# Patient Record
Sex: FEMALE | Employment: FULL TIME | URBAN - METROPOLITAN AREA
[De-identification: names, ages, dates, MRNs, and addresses within clinical notes are randomized per-mention and may not be internally consistent; named-entity substitution may affect disease eponyms.]

---

## 2022-05-31 ENCOUNTER — OFFICE VISIT (OUTPATIENT)
Dept: OBGYN CLINIC | Age: 67
End: 2022-05-31
Payer: COMMERCIAL

## 2022-05-31 VITALS
HEIGHT: 62 IN | DIASTOLIC BLOOD PRESSURE: 71 MMHG | BODY MASS INDEX: 38.9 KG/M2 | SYSTOLIC BLOOD PRESSURE: 111 MMHG | WEIGHT: 211.4 LBS

## 2022-05-31 DIAGNOSIS — Z01.419 ROUTINE GYNECOLOGICAL EXAMINATION: Primary | ICD-10-CM

## 2022-05-31 DIAGNOSIS — R68.82 DECREASED LIBIDO: ICD-10-CM

## 2022-05-31 DIAGNOSIS — Z78.0 OSTEOPENIA AFTER MENOPAUSE: ICD-10-CM

## 2022-05-31 DIAGNOSIS — M85.80 OSTEOPENIA AFTER MENOPAUSE: ICD-10-CM

## 2022-05-31 PROCEDURE — 99387 INIT PM E/M NEW PAT 65+ YRS: CPT | Performed by: OBSTETRICS & GYNECOLOGY

## 2022-05-31 RX ORDER — BUPROPION HYDROCHLORIDE 100 MG/1
TABLET, EXTENDED RELEASE ORAL
COMMUNITY

## 2022-05-31 RX ORDER — ESTRADIOL 10 UG/1
INSERT VAGINAL
COMMUNITY
End: 2022-05-31 | Stop reason: SDUPTHER

## 2022-05-31 RX ORDER — ALPRAZOLAM 1 MG/1
TABLET ORAL
COMMUNITY
Start: 2022-03-13

## 2022-05-31 RX ORDER — ESTRADIOL 10 UG/1
1 INSERT VAGINAL
Qty: 28 EACH | Refills: 3 | Status: SHIPPED | OUTPATIENT
Start: 2022-06-02 | End: 2022-08-31

## 2022-05-31 RX ORDER — ALBUTEROL SULFATE 90 UG/1
AEROSOL, METERED RESPIRATORY (INHALATION)
COMMUNITY

## 2022-05-31 RX ORDER — PANTOPRAZOLE SODIUM 40 MG/1
TABLET, DELAYED RELEASE ORAL
COMMUNITY
Start: 2022-03-10

## 2022-05-31 NOTE — PROGRESS NOTES
ANNUAL EXAM 65+    Ji Santos is a ,  73 y.o. female   No LMP recorded. Patient is postmenopausal.    She presents for her annual checkup. She is having no significant problems. Hormonal status:  She is not having vasomotor symptoms. The patient is using any ERT Imvexxy. Sexual history:  She  reports being sexually active and has had partner(s) who are male. She reports using the following method of birth control/protection: Surgical.   Is having issues with Low sex drive. Medical conditions:  Since her last annual GYN exam about one year ago, she has the following changes in her health history: h pylori / ulcer / GERD. Pap and Mammogram History:  Her most recent Pap smear was normal obtained 2021. The patient had a recent mammogram 2021 which was negative for malignancy. Gyn Flowsheet    GYN HISTORY 2022   Mammogram Date 2021   Mammogram Results WNL   Pap Date 2021   Pap Results WNL Neg HPV       Breast Cancer History: sister & mom    Osteoporosis History:  Family history does not include a first or second degree relative with osteopenia or osteoporosis. A bone density scan was obtained 2018 and revealed Osteopenia. She is currently taking calcium and vit D. Medical History: There is no problem list on file for this patient.     Past Medical History:   Diagnosis Date    Atypical squamous cells of undetermined significance (ASCUS) on Papanicolaou smear of cervix 1995    BRCA negative     COVID-19     GERD (gastroesophageal reflux disease)     Ulcers on EGD    H pylori ulcer     Kidney stones     Osteopenia 2008    Postmenopausal atrophic vaginitis 2017    Uterine polyp     Vitamin B deficiency     Vitamin D deficiency      Past Surgical History:   Procedure Laterality Date    HX COLONOSCOPY      HX HYSTEROSCOPY WITH ENDOMETRIAL ABLATION  2011    Novasure    HX RIGHT SALPINGO-OOPHORECTOMY Right 2011    5.5 cm cyst  Laparoscopic    HX TUBAL LIGATION Bilateral 1982     OB History    Para Term  AB Living   2 2 2 0 0 2   SAB IAB Ectopic Molar Multiple Live Births   0 0 0 0 0 2      # Outcome Date GA Lbr Jeffrey/2nd Weight Sex Delivery Anes PTL Lv   2 Term 83 41w0d  7 lb 14 oz (3.572 kg) M Vag-Spont EPI  FRANCISCA   1 Term 82 42w0d  8 lb (3.629 kg) F Vag-Forceps EPI  FRANCISCA     Social History     Socioeconomic History    Marital status:    Occupational History    Occupation:    Tobacco Use    Smoking status: Never Smoker    Smokeless tobacco: Never Used   Vaping Use    Vaping Use: Never used   Substance and Sexual Activity    Alcohol use: Yes    Drug use: Never    Sexual activity: Yes     Partners: Male     Birth control/protection: Surgical      Family History   Problem Relation Age of Onset    Breast Cancer Mother 64    Breast Cancer Sister 62     Current Outpatient Medications on File Prior to Visit   Medication Sig Dispense Refill    albuterol (PROVENTIL HFA, VENTOLIN HFA, PROAIR HFA) 90 mcg/actuation inhaler albuterol sulfate HFA 90 mcg/actuation aerosol inhaler      ALPRAZolam (XANAX) 1 mg tablet TAKE 1 TAB 1 HOUR PRIOR TO DENTAL APPT. MUST HAVE       buPROPion SR (WELLBUTRIN SR) 100 mg SR tablet bupropion HCl  mg tablet,12 hr sustained-release      pantoprazole (PROTONIX) 40 mg tablet TAKE 1 TABLET BY MOUTH TWICE A DAY BEFORE MEALS      estradioL (Imvexxy Maintenance Pack) 10 mcg inst Insert  into vagina. No current facility-administered medications on file prior to visit.      Allergies   Allergen Reactions    Codeine Other (comments)       Review of Systems   History obtained from the patient-negative for:  Constitutional: weight loss, fever, night sweats  HEENT: hearing loss, earache, congestion, snoring, sorethroat  CV: chest pain, palpitations, edema  Resp: cough, shortness of breath, wheezing  Breast: breast lumps, nipple discharge, galactorrhea  GI: change in bowel habits, abdominal pain, black or bloody stools  : frequency, dysuria, hematuria, vaginal discharge  MSK: back pain, joint pain, muscle pain  Skin: itching, rash, hives  Neuro: dizziness, headache, confusion, weakness  Psych: anxiety, depression, change in mood  Heme/lymph: bleeding, bruising, pallor    Physical Exam  Visit Vitals  /71   Ht 5' 2\" (1.575 m)   Wt 211 lb 6.4 oz (95.9 kg)   BMI 38.67 kg/m²       Constitutional  · Appearance: well-nourished, well developed, alert, in no acute distress    HENT  · Head and Face: appears normal    Neck  · Inspection/Palpation: normal appearance, no masses or tenderness  · Lymph Nodes: no lymphadenopathy present  · Thyroid: gland size normal, nontender, no nodules or masses present on palpation    Chest  · Respiratory Effort: breathing unlabored  · Auscultation: normal breath sounds    Cardiovascular  · Heart:  · Auscultation: regular rate and rhythm without murmur    Breasts  · Inspection of Breasts: breasts symmetrical, no skin changes, no discharge present, nipple appearance normal, no skin retraction present  · Palpation of Breasts and Axillae: no masses present on palpation, no breast tenderness, large breasts  · Axillary Lymph Nodes: no lymphadenopathy present    Gastrointestinal  · Abdominal Examination: abdomen non-tender to palpation, normal bowel sounds, no masses present  · Liver and spleen: no hepatomegaly present, spleen not palpable  · Hernias: no hernias identified    Genitourinary  · External Genitalia: normal appearance for age, no discharge present, no tenderness present, no inflammatory lesions present, no masses present, atrophy present  · Vagina: atrophic but otherwise normal vaginal vault without central or paravaginal defects, no discharge present, no inflammatory lesions present, no masses present  · Bladder: non-tender to palpation  · Urethra: appears normal  · Cervix: normal   · Uterus: normal size, shape and consistency  · Adnexa: no adnexal tenderness present, no adnexal masses present  · Perineum: perineum within normal limits, no evidence of trauma, no rashes or skin lesions present  · Anus: anus within normal limits, no hemorrhoids present  · Inguinal Lymph Nodes: no lymphadenopathy present    Skin  · General Inspection: no rash, no lesions identified    Neurologic/Psychiatric  · Mental Status:  · Orientation: grossly oriented to person, place and time  · Mood and Affect: mood normal, affect appropriate    Labs:  No results found for this or any previous visit (from the past 12 hour(s)). Assessment:  Routine gynecologic examination  Her current medical status is satisfactory with no evidence of significant gynecologic issues. Decreased Libido  Atrophic vaginitis    Plan:  Counseled re: diet, exercise, healthy lifestyle  Return for yearly wellness visits  Rec annual mammogram  Check Testosterone F&T.   Rx with Testosterone cream if low  Refill imvexxy  DEXA

## 2022-06-02 LAB
TESTOST FREE SERPL-MCNC: 1.1 PG/ML (ref 0–4.2)
TESTOST SERPL-MCNC: <3 NG/DL (ref 3–67)

## 2022-06-03 LAB
CYTOLOGIST CVX/VAG CYTO: NORMAL
CYTOLOGY CVX/VAG DOC CYTO: NORMAL
CYTOLOGY CVX/VAG DOC THIN PREP: NORMAL
DX ICD CODE: NORMAL
HPV I/H RISK 4 DNA CVX QL PROBE+SIG AMP: NEGATIVE
Lab: NORMAL
OTHER STN SPEC: NORMAL
STAT OF ADQ CVX/VAG CYTO-IMP: NORMAL

## 2022-06-15 ENCOUNTER — TELEPHONE (OUTPATIENT)
Dept: OBGYN CLINIC | Age: 67
End: 2022-06-15

## 2022-06-15 NOTE — TELEPHONE ENCOUNTER
Patient called and states she hasn't heard anything form jose regarding her lab results. I read her the message you sent her and she states she has not been able to get on her mychart. However, she said she would like to try the prescription you mentioned. Please send in Rx to patient pharmacy. [facility] prefers the [facility] in [facility].     Thank you

## 2022-06-16 NOTE — TELEPHONE ENCOUNTER
Emely Adames MD  You 19 minutes ago (12:12 PM)     GM    Call in testosterone 2%      Informed patient script sent to [facility]. Gave patient SRCP information.

## 2022-12-05 ENCOUNTER — HOSPITAL ENCOUNTER (OUTPATIENT)
Age: 67
Setting detail: OUTPATIENT SURGERY
End: 2022-12-05
Attending: INTERNAL MEDICINE | Admitting: INTERNAL MEDICINE

## 2023-02-21 RX ORDER — PANTOPRAZOLE SODIUM 40 MG/1
40 TABLET, DELAYED RELEASE ORAL
COMMUNITY

## 2023-05-30 ENCOUNTER — HOSPITAL ENCOUNTER (OUTPATIENT)
Facility: HOSPITAL | Age: 68
Setting detail: OUTPATIENT SURGERY
Discharge: HOME | End: 2023-05-30
Attending: SPECIALIST | Admitting: SPECIALIST
Payer: MEDICARE

## 2023-05-30 ENCOUNTER — ANESTHESIA EVENT (OUTPATIENT)
Facility: HOSPITAL | Age: 68
End: 2023-05-30
Payer: MEDICARE

## 2023-05-30 ENCOUNTER — ANESTHESIA (OUTPATIENT)
Facility: HOSPITAL | Age: 68
End: 2023-05-30
Payer: MEDICARE

## 2023-05-30 VITALS
TEMPERATURE: 98.1 F | SYSTOLIC BLOOD PRESSURE: 129 MMHG | DIASTOLIC BLOOD PRESSURE: 67 MMHG | HEART RATE: 80 BPM | RESPIRATION RATE: 16 BRPM | BODY MASS INDEX: 36.17 KG/M2 | WEIGHT: 204.15 LBS | OXYGEN SATURATION: 97 % | HEIGHT: 63 IN

## 2023-05-30 PROCEDURE — 7100000010 HC PHASE II RECOVERY - FIRST 15 MIN: Performed by: SPECIALIST

## 2023-05-30 PROCEDURE — 3700000000 HC ANESTHESIA ATTENDED CARE: Performed by: SPECIALIST

## 2023-05-30 PROCEDURE — 2709999900 HC NON-CHARGEABLE SUPPLY: Performed by: SPECIALIST

## 2023-05-30 PROCEDURE — 2500000003 HC RX 250 WO HCPCS: Performed by: NURSE ANESTHETIST, CERTIFIED REGISTERED

## 2023-05-30 PROCEDURE — 3700000001 HC ADD 15 MINUTES (ANESTHESIA): Performed by: SPECIALIST

## 2023-05-30 PROCEDURE — 3600007512: Performed by: SPECIALIST

## 2023-05-30 PROCEDURE — 3600007502: Performed by: SPECIALIST

## 2023-05-30 PROCEDURE — 7100000011 HC PHASE II RECOVERY - ADDTL 15 MIN: Performed by: SPECIALIST

## 2023-05-30 PROCEDURE — 2580000003 HC RX 258: Performed by: SPECIALIST

## 2023-05-30 PROCEDURE — 6360000002 HC RX W HCPCS: Performed by: NURSE ANESTHETIST, CERTIFIED REGISTERED

## 2023-05-30 PROCEDURE — 2720000010 HC SURG SUPPLY STERILE: Performed by: SPECIALIST

## 2023-05-30 RX ORDER — SODIUM CHLORIDE 0.9 % (FLUSH) 0.9 %
5-40 SYRINGE (ML) INJECTION PRN
Status: DISCONTINUED | OUTPATIENT
Start: 2023-05-30 | End: 2023-05-30 | Stop reason: HOSPADM

## 2023-05-30 RX ORDER — PROPOFOL 10 MG/ML
INJECTION, EMULSION INTRAVENOUS CONTINUOUS PRN
Status: DISCONTINUED | OUTPATIENT
Start: 2023-05-30 | End: 2023-05-30 | Stop reason: SDUPTHER

## 2023-05-30 RX ORDER — SODIUM CHLORIDE 9 MG/ML
INJECTION, SOLUTION INTRAVENOUS CONTINUOUS
Status: DISCONTINUED | OUTPATIENT
Start: 2023-05-30 | End: 2023-05-30 | Stop reason: HOSPADM

## 2023-05-30 RX ORDER — DEXMEDETOMIDINE HYDROCHLORIDE 100 UG/ML
INJECTION, SOLUTION INTRAVENOUS PRN
Status: DISCONTINUED | OUTPATIENT
Start: 2023-05-30 | End: 2023-05-30 | Stop reason: SDUPTHER

## 2023-05-30 RX ORDER — PROPOFOL 10 MG/ML
INJECTION, EMULSION INTRAVENOUS PRN
Status: DISCONTINUED | OUTPATIENT
Start: 2023-05-30 | End: 2023-05-30 | Stop reason: SDUPTHER

## 2023-05-30 RX ORDER — LIDOCAINE HYDROCHLORIDE 20 MG/ML
INJECTION, SOLUTION EPIDURAL; INFILTRATION; INTRACAUDAL; PERINEURAL PRN
Status: DISCONTINUED | OUTPATIENT
Start: 2023-05-30 | End: 2023-05-30 | Stop reason: SDUPTHER

## 2023-05-30 RX ADMIN — PROPOFOL 100 MCG/KG/MIN: 10 INJECTION, EMULSION INTRAVENOUS at 10:16

## 2023-05-30 RX ADMIN — LIDOCAINE HYDROCHLORIDE 50 MG: 20 INJECTION, SOLUTION EPIDURAL; INFILTRATION; INTRACAUDAL; PERINEURAL at 10:16

## 2023-05-30 RX ADMIN — DEXMEDETOMIDINE 10 MCG: 100 INJECTION, SOLUTION INTRAVENOUS at 10:16

## 2023-05-30 RX ADMIN — SODIUM CHLORIDE: 9 INJECTION, SOLUTION INTRAVENOUS at 10:10

## 2023-05-30 RX ADMIN — PROPOFOL 100 MG: 10 INJECTION, EMULSION INTRAVENOUS at 10:16

## 2023-05-30 ASSESSMENT — PAIN - FUNCTIONAL ASSESSMENT: PAIN_FUNCTIONAL_ASSESSMENT: NONE - DENIES PAIN

## 2023-05-30 NOTE — PROGRESS NOTES
Endoscopy discharge instructions have been reviewed and given to patient. The patient verbalized understanding and acceptance of instructions. Reviewed Bravo Discharge instructions with patient and patient to return Bravo, diary and strap on 6/1/2023 at 1030am. Patient states understanding. Dr. Amadeo English discussed with spouse procedure findings and next steps.

## 2023-05-30 NOTE — PERIOP NOTE
Report from Esmer Chung CRNA, see anesthesia record. ABD remains soft and non-tender post procedure. Pt has no complaints at this time and tolerated the procedure well. Endoscope was pre-cleaned at bedside immediately following procedure by Akua. Pt to return Bravo recorder and diary on 6/1/23 at approx 1030.

## 2023-05-30 NOTE — ANESTHESIA POSTPROCEDURE EVALUATION
Department of Anesthesiology  Postprocedure Note    Patient: Blayne Wong  MRN: [id number]  YOB: 1955  Date of evaluation: 5/30/2023      Procedure Summary     Date: 05/30/23 Room / Location: [facility] 03 / [facility] ENDOSCOPY    Anesthesia Start: 1010 Anesthesia Stop: 1026    Procedures:       BRAVO CLIP PLACEMENT (Upper GI Region)      ESOPHAGOGASTRODUODENOSCOPY (Upper GI Region) Diagnosis:       Regurgitation of food      Cough, unspecified type      (Regurgitation of food [R11.10])      (Cough, unspecified type [R05.9])    Surgeons: Daniele Glez MD Responsible Provider: Bisi Barcenas MD    Anesthesia Type: MAC ASA Status: 2          Anesthesia Type: No value filed.     Antonette Phase I: Antonette Score: 8    Antonette Phase II: Antonette Score: 10      Anesthesia Post Evaluation    Patient location during evaluation: PACU  Patient participation: complete - patient participated  Level of consciousness: awake  Airway patency: patent  Nausea & Vomiting: no vomiting and no nausea  Complications: no  Cardiovascular status: hemodynamically stable  Respiratory status: acceptable  Hydration status: stable

## 2023-05-30 NOTE — OP NOTE
[address] [facility]  [address] [facility] [address] [place], [place]  [phone]      May 30, 2023    Esophagogastroduodenoscopy (EGD) Procedure Note  Ceferino Kaba  : 1955  [facility] Medical Record Number: [id number]      Indications:   GERD  Referring Physician:  Marco Antonio Kumar MD  Anesthesia/Sedation:  Conscious sedation/deep sedation/monitored anesthesia -- see notes. Endoscopist:  Dr. Festus Daley  Complications:  None  Estimated Blood Loss:  None    Permit:  The indications, risks, benefits and alternatives were reviewed with the patient or their decision maker who was provided an opportunity to ask questions and all questions were answered. The specific risks of esophagogastroduodenoscopy with conscious sedation were reviewed, including but not limited to anesthetic complication, bleeding, adverse drug reaction, missed lesion, infection, IV site reactions, and intestinal perforation which would lead to the need for surgical repair. Alternatives to EGD including radiographic imaging, observation without testing, or laboratory testing were reviewed as well as the limitations of those alternatives discussed. After considering the options and having all their questions answered, the patient or their decision maker provided both verbal and written consent to proceed. Procedure in Detail:  After obtaining informed consent, positioning of the patient in the left lateral decubitus position, and conduction of a pre-procedure pause or \"time out\" the endoscope was introduced into the mouth and advanced to the duodenum. A careful inspection was made, and findings or interventions are described below. Findings:   Esophagus:5 cm hiatal hernia, LES at 35cm. Bravo pH electrode placed 5cm above LES.     Stomach: normal   Duodenum/jejunum: normal      Specimens: none    Impression: Hiatal hernia           Recommendations:  -Await pH

## 2023-05-30 NOTE — H&P
Date: 2022 9:15 AM  Patient Name: Penelope Vines  Account #: [id number]  Gender: Female   (age): 1955 (78)  Provider:    Michael Anderson MD     Referring Physician:    Terry Meredith  [address]  [phone] (phone)  [phone] (fax)     Chief Complaint:    GERD     History of Present Illness:  78 F has been referred to GI clinic by pulmonology Dr Dimitir Garcia for symptoms of GERD. Has had symptoms of regurgitation for last 2 years, also with associated symptoms of coughing while eating. No wthe coughing has progressed to other times of the day even when not eating. Gets symptoms of regurgitation with both solids and liquids. No dysphagia. No weight loss. Gets nausea. No vomiting. No epigastric pain. Had a recent episode of left sided pneumonia believed to be aspiration. She had a bad episode of regurgitation before she was diagnosed with pneumonia. Takes protonix 40 mg po BID but doesn't think it has helped with any of her symptoms. Has tried elevating the head of the bed and eating 3 hours before bedtime which has partially helped. Takes Celecoxib 2-3 times a week for back pain. No alcohol use. Has had ENT evaluation and tried antiallergics which have not helped with the cough. Recent MBS described below was normal.          MBS 2022    FINDINGS: The patient was given thin liquids, pudding consistencies,  and regular solids. There was normal swallowing function. Normal  epiglottic motion. No laryngeal penetration or tracheal aspiration. Degenerative disc disease with osteophyte at C4-C5 and C5-C6. IMPRESSION: Normal swallowing function. EGD 2021 by Dr Dalila Del Cid at [facility].    Impression: - Erythematous duodenopathy.  - Non-bleeding erosive gastropathy. Biopsied. - 4 cm hiatal hernia. - LA Grade A reflux esophagitis. Biopsied. Dilated.     Colonoscopy. 2021    Impression: - The entire examined colon is normal on direct

## 2023-05-30 NOTE — DISCHARGE INSTRUCTIONS
[address] [facility] ANALY Becker MD  [phone]      May 30, 2023     Kenroy Chirinos  YOB: 1955    ENDOSCOPY DISCHARGE INSTRUCTIONS    If there is redness at IV site you should apply warm compress to area. If redness or soreness persist contact Dr. Mu Becker' or your primary care doctor. Gaseous discomfort may develop, but walking, belching will help relieve this. You may not operate a vehicle for 12 hours  You may not operate machinery or dangerous appliances for rest of today  You may not drink alcoholic beverages for 12 hours  Avoid making any critical decisions for 24 hours    DIET:  You may resume your normal diet, but some patients find that heavy or large meals may lead to indigestion or vomiting. I suggest a light meal as first food intake. MEDICATIONS:  The use of some over-the-counter pain medication may lead to bleeding after biopsies or other procedures you may have had done. Tylenol (also called acetaminophen) is safe to take and will not lead to bleeding. Based on the procedure you had today you may safely take aspirin or aspirin-like products for the next ten (10) days. ACTIVITY:  You may resume your normal household activities, but it is recommended that you spend the remainder of the day resting -  avoid any strenuous activity. CALL DR. Charlestine Bamberger' OFFICE IF:  Increasing pain, nausea, vomiting  Abdominal distension (swelling)  Significant new or increased bleeding (oral or rectal)  Fever/Chills  Chest pain/shortness of breath                   Additional instructions: We found a hiatal hernia but no other problems. The pH electrode is in place, follow the instructions and return. I will evaluate the pH data on Friday. It was an honor to be your doctor today. Please let me or my office staff know if you have any feedback about today's procedure.     Alfa Pulido MD Authored by Resident/PA/NP

## 2023-05-30 NOTE — H&P
Pre-Endoscopy H&P Update  Chief complaint/HPI/ROS:  The indication for the procedure, the patient's history and the patient's current medications are reviewed prior to the procedure and that data is reported on the H&P to which this document is attached. Any significant complaints with regard to organ systems will be noted, and if not mentioned then a review of systems is not contributory. Past Medical History:   Diagnosis Date    Arrhythmia     PVCs    Arthritis     back    Atypical squamous cells of undetermined significance (ASCUS) on Papanicolaou smear of cervix 11/13/1995    BRCA negative     COVID-19     GERD (gastroesophageal reflux disease)     Ulcers on EGD    H pylori ulcer     Kidney stones     x 1 - passed on own    Nausea & vomiting     severe    Osteopenia 09/2008    Postmenopausal atrophic vaginitis 05/2017    Sleep apnea     does not use a machine    Uterine polyp     Vitamin B deficiency     Vitamin D deficiency       Past Surgical History:   Procedure Laterality Date    COLONOSCOPY  2011    ENDOMETRIAL ABLATION  08/09/2011    Novasure    GI      colonoscopy    HEENT      sinus surgery    SALPINGO-OOPHORECTOMY Right 08/09/2011    5.5 cm cyst  Laparoscopic    TUBAL LIGATION Bilateral 09/17/1982     Social   Social History     Tobacco Use    Smoking status: Never    Smokeless tobacco: Never   Substance Use Topics    Alcohol use: Never      Family History   Problem Relation Age of Onset    Breast Cancer Mother 64    Hypertension Father     Diabetes Father     Stroke Father     Breast Cancer Sister 62      Allergies   Allergen Reactions    Codeine Nausea And Vomiting      Prior to Admission Medications   Prescriptions Last Dose Informant Patient Reported? Taking? pantoprazole (PROTONIX) 40 MG tablet 5/24/2023  Yes No   Sig: Take 1 tablet by mouth      Facility-Administered Medications: None       PHYSICAL EXAM:  The patient is examined immediately prior to the procedure.   Vitals:    05/30/23 0932

## 2023-05-30 NOTE — ANESTHESIA PRE PROCEDURE
Department of Anesthesiology  Preprocedure Note       Name:  Tristan Raymond   Age:  78 y.o.  :  1955                                          MRN:  [id number]         Date:  2023      Surgeon: Farheen Diamond):  Ren Paiz MD    Procedure: Procedure(s):  BRAVO CLIP PLACEMENT  ESOPHAGOGASTRODUODENOSCOPY    Medications prior to admission:   Prior to Admission medications    Medication Sig Start Date End Date Taking? Authorizing Provider   pantoprazole (PROTONIX) 40 MG tablet Take 1 tablet by mouth    Ar Automatic Reconciliation       Current medications:    Current Facility-Administered Medications   Medication Dose Route Frequency Provider Last Rate Last Admin    0.9 % sodium chloride infusion   IntraVENous Continuous Ren Paiz MD        sodium chloride flush 0.9 % injection 5-40 mL  5-40 mL IntraVENous PRN Ren Paiz MD           Allergies: Allergies   Allergen Reactions    Codeine Nausea And Vomiting       Problem List:  There is no problem list on file for this patient.       Past Medical History:        Diagnosis Date    Arrhythmia     PVCs    Arthritis     back    Atypical squamous cells of undetermined significance (ASCUS) on Papanicolaou smear of cervix 1995    BRCA negative     COVID-19     GERD (gastroesophageal reflux disease)     Ulcers on EGD    H pylori ulcer     Kidney stones     x 1 - passed on own    Nausea & vomiting     severe    Osteopenia 2008    Postmenopausal atrophic vaginitis 2017    Sleep apnea     does not use a machine    Uterine polyp     Vitamin B deficiency     Vitamin D deficiency        Past Surgical History:        Procedure Laterality Date    COLONOSCOPY      ENDOMETRIAL ABLATION  2011    Novasure    GI      colonoscopy    HEENT      sinus surgery    SALPINGO-OOPHORECTOMY Right 2011    5.5 cm cyst  Laparoscopic    TUBAL LIGATION Bilateral 1982       Social History:    Social History

## 2023-05-30 NOTE — PROGRESS NOTES
Marjan Lopez  1955  [id number]    Situation:  Verbal report received from: Brunilda GOMEZ  Procedure: Procedure(s):  BRAVO CLIP PLACEMENT  ESOPHAGOGASTRODUODENOSCOPY    Background:    Preoperative diagnosis: Regurgitation of food [R11.10]  Cough, unspecified type [R05.9]  Postoperative diagnosis: * No post-op diagnosis entered *    :  Dr. Angelina Mahmood  Assistant(s): Circulator: Maria Alejandra Vega RN  Endoscopy Technician: Augusto Maldonado    Specimens: * No specimens in log *  H. Pylori  No    Assessment:  Intra-procedure medications     Anesthesia gave intra-procedure sedation and medications, see anesthesia flow sheet Yes    Intravenous fluids: NS@ KVO     Vital signs stable yes    Abdominal assessment: round and soft  yes    Recommendation:  Discharge patient per MD order yes.   Family or Friend   Permission to share finding with family or friend Yes

## 2023-06-01 ENCOUNTER — OFFICE VISIT (OUTPATIENT)
Age: 68
End: 2023-06-01
Payer: MEDICARE

## 2023-06-01 VITALS
SYSTOLIC BLOOD PRESSURE: 111 MMHG | DIASTOLIC BLOOD PRESSURE: 78 MMHG | HEIGHT: 63 IN | BODY MASS INDEX: 36.21 KG/M2 | WEIGHT: 204.4 LBS

## 2023-06-01 DIAGNOSIS — Z78.0 OSTEOPENIA AFTER MENOPAUSE: ICD-10-CM

## 2023-06-01 DIAGNOSIS — Z01.419 WELL WOMAN EXAM WITH ROUTINE GYNECOLOGICAL EXAM: Primary | ICD-10-CM

## 2023-06-01 DIAGNOSIS — M85.80 OSTEOPENIA AFTER MENOPAUSE: ICD-10-CM

## 2023-06-01 PROBLEM — K44.9 HIATAL HERNIA: Status: ACTIVE | Noted: 2023-06-01

## 2023-06-01 PROBLEM — G47.30 SLEEP APNEA: Status: ACTIVE | Noted: 2023-06-01

## 2023-06-01 PROCEDURE — 99397 PER PM REEVAL EST PAT 65+ YR: CPT | Performed by: OBSTETRICS & GYNECOLOGY

## 2023-06-01 NOTE — PROGRESS NOTES
Ismael Goodson is a 78 y.o. female returns for an annual exam     Chief Complaint   Patient presents with    Annual Exam       No LMP recorded. Patient is postmenopausal.  Her periods are absent in flow. Problems: no problems  Birth Control: post menopausal status. Last Pap: normal obtained 1 year(s) ago. She does not have a history of DANIKA 2, 3 or cervical cancer. Last Mammogram: had her mammogram today in our office. Last Bone Density: abnormal Osteopenia obtained 7/2/2018. Last colonoscopy: normal obtained 7/2021.      1. Have you been to the ER, urgent care clinic, or hospitalized since your last visit? ER 9/2022 pneumonia. 2. Have you seen or consulted any other health care providers outside of the [address] [place] [address] since your last visit? No    Examination chaperoned by Eulah Brunner, CMA.
grossly oriented to person, place and time  Mood and Affect: mood normal, affect appropriate    Labs:  No results found for this or any previous visit (from the past 12 hour(s)). Assessment:   Diagnosis Orders   1.  Well woman exam with routine gynecological exam  PAP LB, Reflex HPV ASCUS ([id number]) ([facility])        Plan:  Counseled re: diet, exercise, healthy lifestyle  Rec annual mammogram  Return in about 2 years (around 6/1/2025) for Annual.
yes

## 2023-06-02 NOTE — OP NOTE
48 hour pH report. Date of placement 5/22/2023  Date of data analysis 6/02/2023    Indication GERD    Results: Good quality study without significant signal loss. 5.3% acid exposure time  36 reflux events  7 long reflux events, Longest reflux event 42 Minutes  DeMeester score 22.  (>14 considered significant). SAP analysis   She triggered one occurrence of reflux, SAP 0  She triggered three occurrences of regurgitation, SAP 75    Assessment:  DeMeester score is supportive of GERD. SAP analysis difficult to rely upon with such low N. Plan: As per her referring gastroenterologist, Dr. Valentina Gregory.     Jean Claude Bourne MD

## 2023-07-19 ENCOUNTER — HOSPITAL ENCOUNTER (OUTPATIENT)
Facility: HOSPITAL | Age: 68
Discharge: HOME | End: 2023-07-22
Attending: OBSTETRICS & GYNECOLOGY
Payer: MEDICARE

## 2023-07-19 DIAGNOSIS — M85.80 OSTEOPENIA AFTER MENOPAUSE: ICD-10-CM

## 2023-07-19 DIAGNOSIS — Z78.0 OSTEOPENIA AFTER MENOPAUSE: ICD-10-CM

## 2023-07-19 PROCEDURE — 77080 DXA BONE DENSITY AXIAL: CPT

## 2023-07-27 ENCOUNTER — TELEPHONE (OUTPATIENT)
Age: 68
End: 2023-07-27

## 2023-07-27 NOTE — TELEPHONE ENCOUNTER
Patient name and  verified      69yo last ae 23 had bone density testing 23 and PT is calling for results and MD recommendation. Advised PT results were still pending and not back yet. Advised PT to allow at least 7 to 10 business days and to call back because she does not use My Chart. PT verbalizes understanding.

## 2023-08-01 ENCOUNTER — TELEPHONE (OUTPATIENT)
Age: 68
End: 2023-08-01

## 2023-08-01 NOTE — TELEPHONE ENCOUNTER
Two patient last seen in the office on       80year old patient last seen in the office on 6/1/2023 for ae      Patient had bone density done on 7/19/2023 and is calling about the results and next steps      Please advise    Thank you

## 2023-08-02 NOTE — TELEPHONE ENCOUNTER
This nurse attempted to reach the patient and left a detailed message that her bone density results are not yet  back for MD to review and to watch her my chart for a message from <MD when they have been reviewed

## 2023-08-10 ENCOUNTER — TELEPHONE (OUTPATIENT)
Age: 68
End: 2023-08-10

## 2023-08-10 DIAGNOSIS — M81.0 OSTEOPOROSIS, UNSPECIFIED OSTEOPOROSIS TYPE, UNSPECIFIED PATHOLOGICAL FRACTURE PRESENCE: Primary | ICD-10-CM

## 2023-08-10 NOTE — TELEPHONE ENCOUNTER
Writer spoke to pt and notified her that her bone density results is back but not yet review by provider that she will get a call once the review is done. Pt also wants to have a feedback in regard of the transfer of her records from [address] [place] [address].

## 2023-08-16 PROBLEM — Z80.3 FAMILY HISTORY OF BREAST CANCER: Status: ACTIVE | Noted: 2023-08-16

## 2023-11-29 ENCOUNTER — APPOINTMENT (OUTPATIENT)
Facility: HOSPITAL | Age: 68
End: 2023-11-29
Payer: MEDICARE

## 2023-11-29 ENCOUNTER — HOSPITAL ENCOUNTER (EMERGENCY)
Facility: HOSPITAL | Age: 68
Discharge: HOME | End: 2023-11-29
Attending: STUDENT IN AN ORGANIZED HEALTH CARE EDUCATION/TRAINING PROGRAM
Payer: MEDICARE

## 2023-11-29 VITALS
OXYGEN SATURATION: 96 % | HEIGHT: 63 IN | TEMPERATURE: 98.3 F | BODY MASS INDEX: 36.33 KG/M2 | HEART RATE: 81 BPM | RESPIRATION RATE: 18 BRPM | SYSTOLIC BLOOD PRESSURE: 149 MMHG | DIASTOLIC BLOOD PRESSURE: 76 MMHG | WEIGHT: 205.03 LBS

## 2023-11-29 DIAGNOSIS — J03.90 ACUTE TONSILLITIS, UNSPECIFIED ETIOLOGY: ICD-10-CM

## 2023-11-29 DIAGNOSIS — R05.1 ACUTE COUGH: Primary | ICD-10-CM

## 2023-11-29 LAB
ANION GAP SERPL CALC-SCNC: 10 MMOL/L (ref 5–15)
BASOPHILS # BLD: 0.1 K/UL (ref 0–0.1)
BASOPHILS NFR BLD: 1 % (ref 0–1)
BUN SERPL-MCNC: 9 MG/DL (ref 6–20)
BUN/CREAT SERPL: 13 (ref 12–20)
CALCIUM SERPL-MCNC: 8.6 MG/DL (ref 8.5–10.1)
CHLORIDE SERPL-SCNC: 104 MMOL/L (ref 97–108)
CO2 SERPL-SCNC: 25 MMOL/L (ref 21–32)
CREAT SERPL-MCNC: 0.68 MG/DL (ref 0.55–1.02)
DEPRECATED S PYO AG THROAT QL EIA: NEGATIVE
DIFFERENTIAL METHOD BLD: ABNORMAL
EOSINOPHIL # BLD: 0.2 K/UL (ref 0–0.4)
EOSINOPHIL NFR BLD: 2 % (ref 0–7)
ERYTHROCYTE [DISTWIDTH] IN BLOOD BY AUTOMATED COUNT: 12.2 % (ref 11.5–14.5)
GLUCOSE SERPL-MCNC: 91 MG/DL (ref 65–100)
HCT VFR BLD AUTO: 45.6 % (ref 35–47)
HGB BLD-MCNC: 15.9 G/DL (ref 11.5–16)
IMM GRANULOCYTES # BLD AUTO: 0 K/UL (ref 0–0.04)
IMM GRANULOCYTES NFR BLD AUTO: 0 % (ref 0–0.5)
LYMPHOCYTES # BLD: 2.9 K/UL (ref 0.8–3.5)
LYMPHOCYTES NFR BLD: 28 % (ref 12–49)
MCH RBC QN AUTO: 30.3 PG (ref 26–34)
MCHC RBC AUTO-ENTMCNC: 34.9 G/DL (ref 30–36.5)
MCV RBC AUTO: 87 FL (ref 80–99)
MONOCYTES # BLD: 0.8 K/UL (ref 0–1)
MONOCYTES NFR BLD: 8 % (ref 5–13)
NEUTS SEG # BLD: 6.3 K/UL (ref 1.8–8)
NEUTS SEG NFR BLD: 61 % (ref 32–75)
NRBC # BLD: 0 K/UL (ref 0–0.01)
NRBC BLD-RTO: 0 PER 100 WBC
PLATELET # BLD AUTO: 260 K/UL (ref 150–400)
PMV BLD AUTO: 10.2 FL (ref 8.9–12.9)
POTASSIUM SERPL-SCNC: 3.7 MMOL/L (ref 3.5–5.1)
RBC # BLD AUTO: 5.24 M/UL (ref 3.8–5.2)
SODIUM SERPL-SCNC: 139 MMOL/L (ref 136–145)
WBC # BLD AUTO: 10.2 K/UL (ref 3.6–11)

## 2023-11-29 PROCEDURE — 87880 STREP A ASSAY W/OPTIC: CPT

## 2023-11-29 PROCEDURE — 80048 BASIC METABOLIC PNL TOTAL CA: CPT

## 2023-11-29 PROCEDURE — 87070 CULTURE OTHR SPECIMN AEROBIC: CPT

## 2023-11-29 PROCEDURE — 85025 COMPLETE CBC W/AUTO DIFF WBC: CPT

## 2023-11-29 PROCEDURE — 6360000004 HC RX CONTRAST MEDICATION: Performed by: STUDENT IN AN ORGANIZED HEALTH CARE EDUCATION/TRAINING PROGRAM

## 2023-11-29 PROCEDURE — 36415 COLL VENOUS BLD VENIPUNCTURE: CPT

## 2023-11-29 PROCEDURE — 70491 CT SOFT TISSUE NECK W/DYE: CPT

## 2023-11-29 PROCEDURE — 71045 X-RAY EXAM CHEST 1 VIEW: CPT

## 2023-11-29 PROCEDURE — 99285 EMERGENCY DEPT VISIT HI MDM: CPT

## 2023-11-29 RX ORDER — BENZONATATE 100 MG/1
100 CAPSULE ORAL 3 TIMES DAILY PRN
Qty: 30 CAPSULE | Refills: 0 | Status: SHIPPED | OUTPATIENT
Start: 2023-11-29 | End: 2023-11-29 | Stop reason: SINTOL

## 2023-11-29 RX ORDER — IOPAMIDOL 612 MG/ML
100 INJECTION, SOLUTION INTRATHECAL
Status: COMPLETED | OUTPATIENT
Start: 2023-11-29 | End: 2023-11-29

## 2023-11-29 RX ORDER — FAMOTIDINE 20 MG/1
20 TABLET, FILM COATED ORAL 2 TIMES DAILY
Qty: 28 TABLET | Refills: 0 | Status: SHIPPED | OUTPATIENT
Start: 2023-11-29 | End: 2023-12-13

## 2023-11-29 RX ORDER — CEPHALEXIN 500 MG/1
500 CAPSULE ORAL 4 TIMES DAILY
Qty: 28 CAPSULE | Refills: 0 | Status: SHIPPED | OUTPATIENT
Start: 2023-11-29 | End: 2023-12-06

## 2023-11-29 RX ADMIN — IOPAMIDOL 100 ML: 612 INJECTION, SOLUTION INTRATHECAL at 13:42

## 2023-11-29 ASSESSMENT — PAIN - FUNCTIONAL ASSESSMENT
PAIN_FUNCTIONAL_ASSESSMENT: 0-10
PAIN_FUNCTIONAL_ASSESSMENT: ACTIVITIES ARE NOT PREVENTED

## 2023-11-29 ASSESSMENT — PAIN DESCRIPTION - FREQUENCY: FREQUENCY: CONTINUOUS

## 2023-11-29 ASSESSMENT — PAIN DESCRIPTION - ORIENTATION: ORIENTATION: RIGHT

## 2023-11-29 ASSESSMENT — PAIN SCALES - GENERAL: PAINLEVEL_OUTOF10: 2

## 2023-11-29 ASSESSMENT — PAIN DESCRIPTION - LOCATION: LOCATION: EAR

## 2023-11-29 ASSESSMENT — PAIN DESCRIPTION - DESCRIPTORS: DESCRIPTORS: ACHING

## 2023-11-29 ASSESSMENT — PAIN DESCRIPTION - PAIN TYPE: TYPE: ACUTE PAIN

## 2023-11-29 ASSESSMENT — PAIN DESCRIPTION - ONSET: ONSET: ON-GOING

## 2023-11-29 NOTE — ED NOTES
Discharge instructions reviewed with patient. Ambulatory out of treatment room.      Alberto Dominguez RN  11/29/23 [id number]

## 2023-11-30 ASSESSMENT — ENCOUNTER SYMPTOMS
SORE THROAT: 1
COUGH: 1

## 2023-12-01 LAB
BACTERIA SPEC CULT: NORMAL
SERVICE CMNT-IMP: NORMAL

## 2024-06-03 ENCOUNTER — HOSPITAL ENCOUNTER (OUTPATIENT)
Facility: HOSPITAL | Age: 69
Discharge: HOME | End: 2024-06-06
Attending: OBSTETRICS & GYNECOLOGY
Payer: MEDICARE

## 2024-06-03 VITALS — BODY MASS INDEX: 36.32 KG/M2 | WEIGHT: 205 LBS | HEIGHT: 63 IN

## 2024-06-03 DIAGNOSIS — Z12.31 VISIT FOR SCREENING MAMMOGRAM: ICD-10-CM

## 2024-06-03 PROCEDURE — 77063 BREAST TOMOSYNTHESIS BI: CPT

## 2025-07-08 ENCOUNTER — TRANSCRIBE ORDERS (OUTPATIENT)
Facility: HOSPITAL | Age: 70
End: 2025-07-08

## 2025-07-08 DIAGNOSIS — Z12.31 VISIT FOR SCREENING MAMMOGRAM: Primary | ICD-10-CM

## 2025-07-14 ENCOUNTER — HOSPITAL ENCOUNTER (OUTPATIENT)
Facility: HOSPITAL | Age: 70
Discharge: HOME | End: 2025-07-17
Attending: OBSTETRICS & GYNECOLOGY
Payer: MEDICARE

## 2025-07-14 DIAGNOSIS — Z12.31 VISIT FOR SCREENING MAMMOGRAM: ICD-10-CM

## 2025-07-14 PROCEDURE — 77063 BREAST TOMOSYNTHESIS BI: CPT

## (undated) DEVICE — SOLIDIFIER FLD 2OZ 1500CC N DISINF IN BTL DISP SAFESORB

## (undated) DEVICE — KIT COLON W/ 1.1OZ LUB AND 2 END

## (undated) DEVICE — 1200 GUARD II KIT W/5MM TUBE W/O VAC TUBE: Brand: GUARDIAN

## (undated) DEVICE — LINE FILTER NASAL CANNULA SHORT TERM ADULT 6.5

## (undated) DEVICE — BRAVO CF CAPSULE  DELIVERY DEV, 5-PK: Brand: BRAVO

## (undated) DEVICE — ELECTRODE,RADIOTRANSLUCENT,FOAM,3PK: Brand: MEDLINE

## (undated) DEVICE — BITEBLOCK ENDOSCP 60FR MAXI WHT POLYETH STURDY W/ VELC WVN

## (undated) DEVICE — IV STRT KT 3282] LSL INDUSTRIES INC]

## (undated) DEVICE — CATHETER IV 20 GAX1 IN N WNG KINK RESIST INSYT AUTOGRD

## (undated) DEVICE — SET GRAV CK VLV NEEDLESS ST 3 GANGED 4WAY STPCOCK HI FLO 10